# Patient Record
Sex: MALE | Race: WHITE
[De-identification: names, ages, dates, MRNs, and addresses within clinical notes are randomized per-mention and may not be internally consistent; named-entity substitution may affect disease eponyms.]

---

## 2018-08-20 ENCOUNTER — HOSPITAL ENCOUNTER (EMERGENCY)
Dept: HOSPITAL 68 - ERH | Age: 28
End: 2018-08-20
Payer: SELF-PAY

## 2018-08-20 VITALS — SYSTOLIC BLOOD PRESSURE: 139 MMHG | DIASTOLIC BLOOD PRESSURE: 82 MMHG
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
## 2018-08-20 DIAGNOSIS — S09.90XA: ICD-10-CM

## 2018-08-20 DIAGNOSIS — S02.2XXA: Primary | ICD-10-CM
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
## 2018-08-20 NOTE — CT SCAN REPORT
EXAMINATIONS:
CT HEAD WITHOUT CONTRAST AND CT CERVICAL SPINE WITHOUT CONTRAST AND CT FACIAL
BONES WITHOUT CONTRAST
 
CLINICAL INFORMATION:
Trauma. Pain.
 
COMPARISON:
None.
 
TECHNIQUE:
Contiguous helical images of the brain were obtained without IV contrast.
Contiguous helical images of the facial bones were obtained without IV
contrast. Contiguous helical images of the cervical spine were obtained
without IV contrast. Multiplanar reconstructions were performed.
 
FINDINGS:
There are no pathologic extra-axial fluid collections. The lateral, third,
fourth ventricles are nondilated and concordant with the appearance of the
sulci. There is no evidence for acute intraparenchymal hemorrhage or infarct.
There is neither mass nor mass effect. There is no shift of midline
structures. There is mild bilateral maxillary sinus mucosal thickening. The
paranasal sinuses and mastoid air cells are otherwise clear. There are no
osseous lesions. There are bilateral minimally displaced nasal bone
fractures. There are no additional facial bone fractures. The ostiomeatal
units are patent.
 
The cervical vertebra are in normal alignment. Disc heights and vertebral
heights are well-preserved. There are no fractures. There is no prevertebral
soft tissue swelling.
 
There is no cervical lymphadenopathy.
 
The visualized lung apices are clear.
 
IMPRESSION:
 
No evidence for acute intracranial injury.
 
No evidence for acute injury to the cervical spine.
 
Bilateral minimally displaced nasal bone fractures.